# Patient Record
Sex: MALE | Race: WHITE | URBAN - METROPOLITAN AREA
[De-identification: names, ages, dates, MRNs, and addresses within clinical notes are randomized per-mention and may not be internally consistent; named-entity substitution may affect disease eponyms.]

---

## 2017-10-30 ENCOUNTER — HOSPITAL ENCOUNTER (EMERGENCY)
Facility: CLINIC | Age: 67
Discharge: HOME | End: 2017-10-30
Attending: EMERGENCY MEDICINE | Admitting: EMERGENCY MEDICINE
Payer: COMMERCIAL

## 2017-10-30 ENCOUNTER — APPOINTMENT (OUTPATIENT)
Dept: ULTRASOUND IMAGING | Facility: CLINIC | Age: 67
End: 2017-10-30
Attending: EMERGENCY MEDICINE
Payer: COMMERCIAL

## 2017-10-30 VITALS
OXYGEN SATURATION: 96 % | RESPIRATION RATE: 24 BRPM | HEART RATE: 92 BPM | DIASTOLIC BLOOD PRESSURE: 79 MMHG | TEMPERATURE: 97.9 F | SYSTOLIC BLOOD PRESSURE: 142 MMHG

## 2017-10-30 DIAGNOSIS — L03.116 LEFT LEG CELLULITIS: ICD-10-CM

## 2017-10-30 DIAGNOSIS — I89.0 LYMPHEDEMA: ICD-10-CM

## 2017-10-30 LAB
ANION GAP SERPL CALCULATED.3IONS-SCNC: 9 MMOL/L (ref 3–14)
BASOPHILS # BLD AUTO: 0 10E9/L (ref 0–0.2)
BASOPHILS NFR BLD AUTO: 0.3 %
BUN SERPL-MCNC: 23 MG/DL (ref 7–30)
CALCIUM SERPL-MCNC: 8.8 MG/DL (ref 8.5–10.1)
CHLORIDE SERPL-SCNC: 106 MMOL/L (ref 94–109)
CO2 SERPL-SCNC: 24 MMOL/L (ref 20–32)
CREAT SERPL-MCNC: 1.33 MG/DL (ref 0.66–1.25)
DIFFERENTIAL METHOD BLD: ABNORMAL
EOSINOPHIL # BLD AUTO: 0.2 10E9/L (ref 0–0.7)
EOSINOPHIL NFR BLD AUTO: 1.9 %
ERYTHROCYTE [DISTWIDTH] IN BLOOD BY AUTOMATED COUNT: 13.3 % (ref 10–15)
GFR SERPL CREATININE-BSD FRML MDRD: 54 ML/MIN/1.7M2
GLUCOSE SERPL-MCNC: 128 MG/DL (ref 70–99)
HCT VFR BLD AUTO: 37.6 % (ref 40–53)
HGB BLD-MCNC: 12.4 G/DL (ref 13.3–17.7)
IMM GRANULOCYTES # BLD: 0 10E9/L (ref 0–0.4)
IMM GRANULOCYTES NFR BLD: 0.3 %
INR PPP: 1.68 (ref 0.86–1.14)
LACTATE BLD-SCNC: 2 MMOL/L (ref 0.7–2)
LYMPHOCYTES # BLD AUTO: 1.2 10E9/L (ref 0.8–5.3)
LYMPHOCYTES NFR BLD AUTO: 11 %
MCH RBC QN AUTO: 30.1 PG (ref 26.5–33)
MCHC RBC AUTO-ENTMCNC: 33 G/DL (ref 31.5–36.5)
MCV RBC AUTO: 91 FL (ref 78–100)
MONOCYTES # BLD AUTO: 0.9 10E9/L (ref 0–1.3)
MONOCYTES NFR BLD AUTO: 8.3 %
NEUTROPHILS # BLD AUTO: 8.7 10E9/L (ref 1.6–8.3)
NEUTROPHILS NFR BLD AUTO: 78.2 %
NRBC # BLD AUTO: 0 10*3/UL
NRBC BLD AUTO-RTO: 0 /100
PLATELET # BLD AUTO: 174 10E9/L (ref 150–450)
POTASSIUM SERPL-SCNC: 3.8 MMOL/L (ref 3.4–5.3)
RBC # BLD AUTO: 4.12 10E12/L (ref 4.4–5.9)
SODIUM SERPL-SCNC: 139 MMOL/L (ref 133–144)
WBC # BLD AUTO: 11.1 10E9/L (ref 4–11)

## 2017-10-30 PROCEDURE — 96374 THER/PROPH/DIAG INJ IV PUSH: CPT

## 2017-10-30 PROCEDURE — 99285 EMERGENCY DEPT VISIT HI MDM: CPT | Mod: 25

## 2017-10-30 PROCEDURE — 93971 EXTREMITY STUDY: CPT | Mod: LT

## 2017-10-30 PROCEDURE — 83605 ASSAY OF LACTIC ACID: CPT | Performed by: EMERGENCY MEDICINE

## 2017-10-30 PROCEDURE — 80048 BASIC METABOLIC PNL TOTAL CA: CPT | Performed by: EMERGENCY MEDICINE

## 2017-10-30 PROCEDURE — 25000128 H RX IP 250 OP 636: Performed by: EMERGENCY MEDICINE

## 2017-10-30 PROCEDURE — 85025 COMPLETE CBC W/AUTO DIFF WBC: CPT | Performed by: EMERGENCY MEDICINE

## 2017-10-30 PROCEDURE — 85610 PROTHROMBIN TIME: CPT | Performed by: EMERGENCY MEDICINE

## 2017-10-30 RX ORDER — CEPHALEXIN 500 MG/1
500 CAPSULE ORAL 4 TIMES DAILY
Qty: 28 CAPSULE | Refills: 0 | Status: SHIPPED | OUTPATIENT
Start: 2017-10-30 | End: 2017-11-06

## 2017-10-30 RX ORDER — CEFAZOLIN SODIUM 2 G/100ML
2 INJECTION, SOLUTION INTRAVENOUS ONCE
Status: COMPLETED | OUTPATIENT
Start: 2017-10-30 | End: 2017-10-30

## 2017-10-30 RX ADMIN — CEFAZOLIN SODIUM 2 G: 2 INJECTION, SOLUTION INTRAVENOUS at 20:06

## 2017-10-30 ASSESSMENT — ENCOUNTER SYMPTOMS
COUGH: 0
COLOR CHANGE: 1
FEVER: 0
DYSURIA: 0
SHORTNESS OF BREATH: 1

## 2017-10-30 NOTE — ED AVS SNAPSHOT
[facility] Emergency Department    [address]    [facility] [address]    Phone:  [phone]    Fax:  [phone]                                       Enrique Boo Sr.   MRN: [id number]    Department:  [facility] Emergency Department   Date of Visit:  10/30/2017           Patient Information     Date Of Birth          1950        Your diagnoses for this visit were:     Left leg cellulitis     Lymphedema        You were seen by Sanju Ortiz MD.        Discharge Instructions       Please make an appointment to follow up with your primary care provider in 2-3 days if not improving.    Return to ER immediately if you develop: worsening pain, swelling, redness, Fever > 101, persistent nausea or vomiting OR you have any other concerns about your health.      Take an extra dose of Warfarin (4mg) tonight and have your INR checked in 2-3 days    Take an extra dose of Furosemide for the next 3 days, and then go back to one per day.  Check in with your PCP to determine if you need a higher dose going forward.      Discharge Instructions  Cellulitis    Cellulitis is an infection of the skin that occurs when bacteria enter the skin.   Symptoms are generally redness, swelling, warmth and pain.  Your infection appeared to be appropriate to treat at home with antibiotics.  However, sometimes your infection may be worse than it seemed at first, or may worsen with time. If you have new or worse symptoms, you may need to be seen again in the Emergency Department or by your primary provider.    Generally, every Emergency Department visit should have a follow-up clinic visit with either a primary or a specialty clinic/provider. Please follow-up as instructed by your emergency provider today.    Return to the Emergency Department if:    The redness, pain, or swelling gets a lot worse.  If the red area was marked, return if it is red significantly beyond the marked  area.    You are unable to get your antibiotics, or are vomiting (throwing up) these pills, or you cannot take them.    You are feeling more ill, weak or lightheaded.    You start to run a new fever (temperature >101 F).    Anything else about the infection worries or concerns you.  Treatment:    Start your antibiotics right away, and take them as prescribed. Be sure to finish the whole prescription, even if you are better.    Apply a heating pad, warm packs, or warm water soaks to the infected area for 15 minutes at a time, at least 3 times a day. Do not use a heating pad on your feet or legs if you have diabetes. Do not sleep with a heating pad on, since this can cause burns or skin injury.    Rest your injured area for at least 1-2 days. After that you may start using your extremity again as long as there is not too much pain.     Raise the injured area above the level of your heart as much as possible in the first 1-2 days.    Tylenol  (acetaminophen), Motrin  (ibuprofen), or Advil  (ibuprofen) may help may help reduce pain and fever and may help you feel more comfortable. Be sure to read and follow the package directions, and ask your provider if you have questions.    If you were given a prescription for medicine here today, be sure to read all of the information (including the package insert) that comes with your prescription.  This will include important information about the medicine, its side effects, and any warnings that you need to know about.  The pharmacist who fills the prescription can provide more information and answer questions you may have about the medicine.  If you have questions or concerns that the pharmacist cannot address, please call or return to the Emergency Department.     Remember that you can always come back to the Emergency Department if you are not able to see your regular provider in the amount of time listed above, if you get any new symptoms, or if there is anything that  worries you.      24 Hour Appointment Hotline       To make an appointment at any [facility], call [phone] ([phone]). If you don't have a family doctor or clinic, we will help you find one. [facility] clinics are conveniently located to serve the needs of you and your family.             Review of your medicines      START taking        Dose / Directions Last dose taken    cephALEXin 500 MG capsule   Commonly known as:  KEFLEX   Dose:  500 mg   Quantity:  28 capsule        Take 1 capsule (500 mg) by mouth 4 times daily for 7 days   Refills:  0                Prescriptions were sent or printed at these locations (1 Prescription)                   Other Prescriptions                Printed at Department/Unit printer (1 of 1)         cephALEXin (KEFLEX) 500 MG capsule                Procedures and tests performed during your visit     Basic metabolic panel    CBC with platelets differential    INR    Lactic acid whole blood    US Lower Extremity Venous Duplex Left      Orders Needing Specimen Collection     None      Pending Results     Date and Time Order Name Status Description    10/30/2017 1914 US Lower Extremity Venous Duplex Left Preliminary             Pending Culture Results     No orders found from 10/28/2017 to 10/31/2017.            Pending Results Instructions     If you had any lab results that were not finalized at the time of your Discharge, you can call the ED Lab Result RN at [phone]. You will be contacted by this team for any positive Lab results or changes in treatment. The nurses are available 7 days a week from 10A to 6:30P.  You can leave a message 24 hours per day and they will return your call.        Test Results From Your Hospital Stay        10/30/2017  7:29 PM      Component Results     Component Value Ref Range & Units Status    WBC 11.1 (H) 4.0 - 11.0 10e9/L Final    RBC Count 4.12 (L) 4.4 - 5.9 10e12/L Final    Hemoglobin 12.4 (L) 13.3 - 17.7 g/dL Final    Hematocrit  37.6 (L) 40.0 - 53.0 % Final    MCV 91 78 - 100 fl Final    MCH 30.1 26.5 - 33.0 pg Final    MCHC 33.0 31.5 - 36.5 g/dL Final    RDW 13.3 10.0 - 15.0 % Final    Platelet Count 174 150 - 450 10e9/L Final    Diff Method Automated Method  Final    % Neutrophils 78.2 % Final    % Lymphocytes 11.0 % Final    % Monocytes 8.3 % Final    % Eosinophils 1.9 % Final    % Basophils 0.3 % Final    % Immature Granulocytes 0.3 % Final    Nucleated RBCs 0 0 /100 Final    Absolute Neutrophil 8.7 (H) 1.6 - 8.3 10e9/L Final    Absolute Lymphocytes 1.2 0.8 - 5.3 10e9/L Final    Absolute Monocytes 0.9 0.0 - 1.3 10e9/L Final    Absolute Eosinophils 0.2 0.0 - 0.7 10e9/L Final    Absolute Basophils 0.0 0.0 - 0.2 10e9/L Final    Abs Immature Granulocytes 0.0 0 - 0.4 10e9/L Final    Absolute Nucleated RBC 0.0  Final         10/30/2017  7:49 PM      Component Results     Component Value Ref Range & Units Status    Sodium 139 133 - 144 mmol/L Final    Potassium 3.8 3.4 - 5.3 mmol/L Final    Chloride 106 94 - 109 mmol/L Final    Carbon Dioxide 24 20 - 32 mmol/L Final    Anion Gap 9 3 - 14 mmol/L Final    Glucose 128 (H) 70 - 99 mg/dL Final    Urea Nitrogen 23 7 - 30 mg/dL Final    Creatinine 1.33 (H) 0.66 - 1.25 mg/dL Final    GFR Estimate 54 (L) >60 mL/min/1.7m2 Final    Non  GFR Calc    GFR Estimate If Black 65 >60 mL/min/1.7m2 Final    African American GFR Calc    Calcium 8.8 8.5 - 10.1 mg/dL Final         10/30/2017  7:41 PM      Component Results     Component Value Ref Range & Units Status    INR 1.68 (H) 0.86 - 1.14 Final         10/30/2017  7:28 PM      Component Results     Component Value Ref Range & Units Status    Lactic Acid 2.0 0.7 - 2.0 mmol/L Final         10/30/2017  8:13 PM      Narrative     VENOUS ULTRASOUND LEFT LEG  10/30/2017 7:57 PM     HISTORY: Pain, swelling, redness. History of VTE.    COMPARISON: 6/3/2011.    FINDINGS: Examination of the deep veins with graded compression and  color flow Doppler with  spectral wave form analysis shows no evidence  of thrombus in the common femoral vein, femoral vein, popliteal vein  or calf veins.         Impression     IMPRESSION: No evidence of deep venous thrombosis.                  Clinical Quality Measure: Blood Pressure Screening     Your blood pressure was checked while you were in the emergency department today. The last reading we obtained was  BP: 142/79 . Please read the guidelines below about what these numbers mean and what you should do about them.  If your systolic blood pressure (the top number) is less than 120 and your diastolic blood pressure (the bottom number) is less than 80, then your blood pressure is normal. There is nothing more that you need to do about it.  If your systolic blood pressure (the top number) is 120-139 or your diastolic blood pressure (the bottom number) is 80-89, your blood pressure may be higher than it should be. You should have your blood pressure rechecked within a year by a primary care provider.  If your systolic blood pressure (the top number) is 140 or greater or your diastolic blood pressure (the bottom number) is 90 or greater, you may have high blood pressure. High blood pressure is treatable, but if left untreated over time it can put you at risk for heart attack, stroke, or kidney failure. You should have your blood pressure rechecked by a primary care provider within the next 4 weeks.  If your provider in the emergency department today gave you specific instructions to follow-up with your doctor or provider even sooner than that, you should follow that instruction and not wait for up to 4 weeks for your follow-up visit.        Thank you for choosing [facility]       Thank you for choosing [facility] for your care. Our goal is always to provide you with excellent care. Hearing back from our patients is one way we can continue to improve our services. Please take a few minutes to complete the written survey that you may receive  "in the mail after you visit with us. Thank you!        [employer] Online Information     [employer] lets you send messages to your doctor, view your test results, renew your prescriptions, schedule appointments and more. To sign up, go to [email or web address] . Click on \"Log in\" on the left side of the screen, which will take you to the Welcome page. Then click on \"Sign up Now\" on the right side of the page.     You will be asked to enter the access code listed below, as well as some personal information. Please follow the directions to create your username and password.     Your access code is: [email or web address]  Expires: 2018  8:20 PM     Your access code will  in 90 days. If you need help or a new code, please call your [facility] clinic or [phone].        Care EveryWhere ID     This is your Care EveryWhere ID. This could be used by other organizations to access your [facility] medical records  [id number]        Equal Access to Services     [facility] : Hadii tamia fernandezo Soradha, waaxda luqadaha, qaybta kaalmada adejesusita, griffin hoff . So [facility] [phone].    ATENCIÓN: Si habla español, tiene a trujillo disposición servicios gratuitos de asistencia lingüística. Llame al [phone].    We comply with applicable federal civil rights laws and [place] laws. We do not discriminate on the basis of race, color, national origin, age, disability, sex, sexual orientation, or gender identity.            After Visit Summary       This is your record. Keep this with you and show to your community pharmacist(s) and doctor(s) at your next visit.                  "

## 2017-10-30 NOTE — ED AVS SNAPSHOT
[facility] Emergency Department    [address]    [facility] [address]    Phone:  [phone]    Fax:  [phone]                                       Enrique Boo Sr.   MRN: [id number]    Department:  [facility] Emergency Department   Date of Visit:  10/30/2017           After Visit Summary Signature Page     I have received my discharge instructions, and my questions have been answered. I have discussed any challenges I see with this plan with the nurse or doctor.    ..........................................................................................................................................  Patient/Patient Representative Signature      ..........................................................................................................................................  Patient Representative Print Name and Relationship to Patient    ..................................................               ................................................  Date                                            Time    ..........................................................................................................................................  Reviewed by Signature/Title    ...................................................              ..............................................  Date                                                            Time

## 2017-10-31 NOTE — DISCHARGE INSTRUCTIONS
Please make an appointment to follow up with your primary care provider in 2-3 days if not improving.    Return to ER immediately if you develop: worsening pain, swelling, redness, Fever > 101, persistent nausea or vomiting OR you have any other concerns about your health.      Take an extra dose of Warfarin (4mg) tonight and have your INR checked in 2-3 days    Take an extra dose of Furosemide for the next 3 days, and then go back to one per day.  Check in with your PCP to determine if you need a higher dose going forward.      Discharge Instructions  Cellulitis    Cellulitis is an infection of the skin that occurs when bacteria enter the skin.   Symptoms are generally redness, swelling, warmth and pain.  Your infection appeared to be appropriate to treat at home with antibiotics.  However, sometimes your infection may be worse than it seemed at first, or may worsen with time. If you have new or worse symptoms, you may need to be seen again in the Emergency Department or by your primary provider.    Generally, every Emergency Department visit should have a follow-up clinic visit with either a primary or a specialty clinic/provider. Please follow-up as instructed by your emergency provider today.    Return to the Emergency Department if:    The redness, pain, or swelling gets a lot worse.  If the red area was marked, return if it is red significantly beyond the marked area.    You are unable to get your antibiotics, or are vomiting (throwing up) these pills, or you cannot take them.    You are feeling more ill, weak or lightheaded.    You start to run a new fever (temperature >101 F).    Anything else about the infection worries or concerns you.  Treatment:    Start your antibiotics right away, and take them as prescribed. Be sure to finish the whole prescription, even if you are better.    Apply a heating pad, warm packs, or warm water soaks to the infected area for 15 minutes at a time, at least 3 times a day. Do  not use a heating pad on your feet or legs if you have diabetes. Do not sleep with a heating pad on, since this can cause burns or skin injury.    Rest your injured area for at least 1-2 days. After that you may start using your extremity again as long as there is not too much pain.     Raise the injured area above the level of your heart as much as possible in the first 1-2 days.    Tylenol  (acetaminophen), Motrin  (ibuprofen), or Advil  (ibuprofen) may help may help reduce pain and fever and may help you feel more comfortable. Be sure to read and follow the package directions, and ask your provider if you have questions.    If you were given a prescription for medicine here today, be sure to read all of the information (including the package insert) that comes with your prescription.  This will include important information about the medicine, its side effects, and any warnings that you need to know about.  The pharmacist who fills the prescription can provide more information and answer questions you may have about the medicine.  If you have questions or concerns that the pharmacist cannot address, please call or return to the Emergency Department.     Remember that you can always come back to the Emergency Department if you are not able to see your regular provider in the amount of time listed above, if you get any new symptoms, or if there is anything that worries you.

## 2017-10-31 NOTE — ED NOTES
Swelling and redness to right lower leg and shin since this afternoon.  Painful with ambulation.      ABCs intact.  Alert and oriented x 3.

## 2017-10-31 NOTE — ED PROVIDER NOTES
History     Chief Complaint:  Leg pain    The history is provided by the patient.      Enrique Boo Sr. is a 66 year old male with a history of two PE's, DVT, hyperlipidemia, and hypertenison who presents with leg pain. The patient reports that the redness and swelling on his left anterior lower extremity began at his ankle today around 1200 and has progressively increased in size since. He says that the most pain is higher on his shin and has no pain anywhere else beside the reddened area. He presents to the emergency department today due to increasing pain and his history of PE. He says that he has had increasing shortness of breath over the last two months, where he endorses being able to walk 6 blocks without shortness of breath 2 months ago and now only about 1 block. The shortness of breath is not worsened with laying flat. He says that his INR last week was 2.2. He denies any cough, cold symptoms, fever, dysuria, or bowel problems and has no other complaints. Of note, he has a breathing pacemaker in place.     Allergies:  No known drug allergies     Medications:    The patient is not currently taking any prescribed medications.     Past Medical History:    Prostate cancer  PE   GERD  Hypertension   Hyperlipidemia  DVT    Past Surgical History:    Pacemaker implant     Family History:    History reviewed. No pertinent family history.      Social History:  Presents with wife    PCP: Joe Mercer MD    Marital Status:       Review of Systems   Constitutional: Negative for fever.   Respiratory: Positive for shortness of breath. Negative for cough.    Cardiovascular: Positive for leg swelling.   Genitourinary: Negative for dysuria.   Skin: Positive for color change.     Physical Exam     Patient Vitals for the past 24 hrs:   BP Temp Temp src Pulse Resp SpO2   10/30/17 2000 142/79 - - - - 96 %   10/30/17 1902 169/79 97.9  F (36.6  C) Oral 92 24 93 %        Physical Exam   HENT:   Right Ear: External  ear normal.   Left Ear: External ear normal.   Nose: Nose normal.   Eyes: Conjunctivae and lids are normal.   Neck: Neck supple. No tracheal deviation present.   Cardiovascular: Regular rhythm and intact distal pulses.    Pulmonary/Chest: Breath sounds normal. No respiratory distress. He has no wheezes. He has no rales.   Abdominal: Soft. There is no tenderness. There is no rebound and no guarding.   Musculoskeletal:   Right lower extremity: There is 2+ pitting edema with chronic stasis dermatitis changes, no tenderness distal CMS intact    Left lower extremity: There is 3+ pitting edema, tenderness to palpation present mid 6 x 10 cm in total area with associated blanching erythema distal CMS intact, compartments soft   Neurological:   MAEE, no gross focal motor or sensory deficit   Skin: Skin is warm and dry. He is not diaphoretic.   Psychiatric: He has a normal mood and affect.   Nursing note and vitals reviewed.        Emergency Department Course     Imaging:  US Lower Extremity Venous Duplex Left:  IMPRESSION: No evidence of deep venous thrombosis.      Imaging independently reviewed and agree with radiologist interpretation.      Laboratory:  CBC: WNL (WBC 11.1 (high), HGB 12.4 (low), )    BMP: Creatinine 1.33 (high), Glucose 128 (high), ow WNL   INR: 1.68 (high)   Lactic acid whole blood: 2.0     Interventions:  2006: Ancef infusion 2 g IV     Emergency Department Course:  Past medical records, nursing notes, and vitals reviewed.  1900: I performed an exam of the patient and obtained history, as documented above.  IV inserted and blood drawn.   Above interventions provided.   The patient was sent for a US while in the emergency department, findings above.   I personally reviewed the laboratory results with the Patient and answered all related questions prior to spouse.  2018: I rechecked the patient. Findings and plan explained to the Patient and spouse. Patient discharged home with instructions  regarding supportive care, medications, and reasons to return. The importance of close follow-up was reviewed.        Impression & Plan      Medical Decision Making:  Enrique Boo Sr. is a 66 year old male here with left leg pain, swelling, and redness. Exam here concerning for cellulitis given the appearance of tender erythema, asymmetric warmth, induration. Rather than acute chronic dermatitis secondary to chronic lymphedema. US negative for DVT. INR slightly subtherapeutic. We will have him take an extra dose of his warfarin and will have him checked again later in the week. Started on antibiotics here and discharged home. Mild leukocytosis, lactic acid is not elevated. I do not think he needs inpatient care at this point. No criteria for severe sepsis.  He and his wife are comfortable and agreeable with that plan     Diagnosis:    ICD-10-CM    1. Left leg cellulitis L03.116    2. Lymphedema I89.0        Disposition:  Discharged to home.    Discharge Medications:  New Prescriptions    CEPHALEXIN (KEFLEX) 500 MG CAPSULE    Take 1 capsule (500 mg) by mouth 4 times daily for 7 days         Luis Shay  10/30/2017   [facility] EMERGENCY DEPARTMENT  ILuis, am serving as a scribe at 7:00 PM on 10/30/2017 to document services personally performed by Sanju Ortiz MD based on my observations and the provider's statements to me.       Sanju Ortiz MD  10/31/17 0037

## 2018-10-18 ENCOUNTER — HOSPITAL ENCOUNTER (EMERGENCY)
Facility: CLINIC | Age: 68
Discharge: HOME | End: 2018-10-18
Payer: COMMERCIAL

## 2018-10-18 VITALS
HEART RATE: 94 BPM | BODY MASS INDEX: 40.73 KG/M2 | HEIGHT: 69 IN | OXYGEN SATURATION: 97 % | TEMPERATURE: 99.5 F | SYSTOLIC BLOOD PRESSURE: 154 MMHG | DIASTOLIC BLOOD PRESSURE: 90 MMHG | RESPIRATION RATE: 18 BRPM | WEIGHT: 275 LBS

## 2018-10-18 RX ORDER — FUROSEMIDE 40 MG
40 TABLET ORAL
COMMUNITY
Start: 2018-07-25

## 2018-10-18 RX ORDER — POTASSIUM CHLORIDE 750 MG/1
10 TABLET, EXTENDED RELEASE ORAL
COMMUNITY
Start: 2018-07-02

## 2018-10-18 RX ORDER — WARFARIN SODIUM 5 MG/1
5 TABLET ORAL
COMMUNITY
Start: 2016-03-17

## 2018-10-18 RX ORDER — ACETAMINOPHEN 500 MG
1000 TABLET ORAL
COMMUNITY
Start: 2016-06-22

## 2018-10-18 RX ORDER — WARFARIN SODIUM 4 MG/1
4 TABLET ORAL
COMMUNITY
Start: 2017-06-21

## 2018-10-18 RX ORDER — ESCITALOPRAM OXALATE 20 MG/1
20 TABLET ORAL
COMMUNITY

## 2018-10-18 RX ORDER — AMLODIPINE BESYLATE 2.5 MG/1
2.5 TABLET ORAL
COMMUNITY
Start: 2018-08-23

## 2018-10-18 RX ORDER — IBUPROFEN 600 MG/1
600 TABLET, FILM COATED ORAL
COMMUNITY
Start: 2018-04-18

## 2018-10-18 RX ORDER — HYDRALAZINE HYDROCHLORIDE 50 MG/1
25 TABLET, FILM COATED ORAL
COMMUNITY
Start: 2018-08-13

## 2018-10-18 RX ORDER — OMEPRAZOLE 40 MG/1
40 CAPSULE, DELAYED RELEASE ORAL
COMMUNITY

## 2018-10-18 RX ORDER — SIMVASTATIN 40 MG
40 TABLET ORAL
COMMUNITY

## 2018-10-18 RX ORDER — TRAZODONE HYDROCHLORIDE 50 MG/1
50 TABLET, FILM COATED ORAL
COMMUNITY
Start: 2017-03-23

## 2018-10-19 NOTE — ED TRIAGE NOTES
Left lower leg pain started last 1-2 hours, bilateral feet pain due to sores, off and on fever last 2 days, nausea. History of cellulitis for left lower leg, DVT and PE, on warfarin. ABCs.